# Patient Record
Sex: MALE | Race: WHITE | NOT HISPANIC OR LATINO | Employment: OTHER | URBAN - METROPOLITAN AREA
[De-identification: names, ages, dates, MRNs, and addresses within clinical notes are randomized per-mention and may not be internally consistent; named-entity substitution may affect disease eponyms.]

---

## 2017-09-18 PROBLEM — T46.6X5A STATIN MYOPATHY: Status: ACTIVE | Noted: 2017-09-18

## 2017-09-18 PROBLEM — R07.9 CHEST PAIN: Status: ACTIVE | Noted: 2017-09-18

## 2017-09-18 PROBLEM — Z86.19 HISTORY OF LYME DISEASE: Status: ACTIVE | Noted: 2017-09-18

## 2017-09-18 PROBLEM — M10.9 GOUT: Status: ACTIVE | Noted: 2017-09-18

## 2017-09-18 PROBLEM — G72.0 STATIN MYOPATHY: Status: ACTIVE | Noted: 2017-09-18

## 2017-09-18 PROBLEM — I10 HTN (HYPERTENSION): Status: ACTIVE | Noted: 2017-09-18

## 2017-09-18 PROBLEM — E78.5 HYPERLIPIDEMIA: Status: ACTIVE | Noted: 2017-09-18

## 2017-09-18 PROBLEM — I34.1 MVP (MITRAL VALVE PROLAPSE): Status: ACTIVE | Noted: 2017-09-18

## 2017-09-18 PROBLEM — Z82.49 FAMILY HISTORY OF MI (MYOCARDIAL INFARCTION): Status: ACTIVE | Noted: 2017-09-18

## 2018-06-20 ENCOUNTER — HISTORICAL (OUTPATIENT)
Dept: ENDOSCOPY | Facility: HOSPITAL | Age: 63
End: 2018-06-20

## 2022-04-30 NOTE — OP NOTE
* Final Report *    [facility] (Verified)  DISCHARGE DATE:      PREOPERATIVE DIAGNOSIS:  Screening colonoscopy, fecal occult blood test positive.    POSTOPERATIVE DIAGNOSIS:  Colon polyp x1 in the mid rectum.    PROCEDURE:  Colonoscopy with cold biopsy polypectomy x1.    COMPLICATIONS:  None.    BLOOD LOSS:  Minimal.    FINDINGS:  There was a 3 mm polyp in the mid rectum that is removed and retrieved in its entirety with cold biopsy forceps.    RECOMMENDATION:  Re-scope in 5 years.    DETAILS OF THE PROCEDURE:  After the risks and benefits of the procedure were explained to the patient, informed consent was obtained.  He was taken to endoscopy on 06/20/2018.  He was placed on the endoscopy table in the left lateral decubitus position.  Over the course of procedure, he was administered propofol by Anesthesiology.  I performed a rectal examination, which was negative.  I then inserted the colonoscope all the way to the base of the cecum as evidenced by the appendiceal orifice and the ileocecal valve.  I centered the scope and slowly began to retract it looking around in a 360-degree fashion.  There were no mucosal abnormalities throughout the colon.  No masses, no polyps, no AVMs, no diverticula.  In the middle of the rectum though, there was a small 3 mm polyp.  It was a little raised, very easy to remove with cold biopsy forceps.  I retroflexed.  He really did not have significant hemorrhoidal disease to speak of.  The scope was retracted.  The patient tolerated the procedure well and was taken to the recovery room in stable condition.    RECOMMENDATIONS:  Re-scope in 5 years due to colon polyp finding.      ______________________________  MD SB Martinez/MICHELLE  DD:  06/20/2018  Time:  09:30AM  DT:  06/20/2018  Time:  09:40AM  Job #:  [id number]       Result type: Discharge Summary  Result date: June 20, 2018 9:40 CDT  Result status: Auth (Verified)  Result title: [facility]  Performed by: Jeison CLARKE,  Venancio QUINONEZ on June 20, 2018 9:30 CDT  Verified by: Jeison CLARKE, Venancio QUINONEZ on June 22, 2018 7:27 CDT  Encounter info: [id number], [facility], Day Surgery, 6/20/2018 - 6/20/2018  Contributor system: [facility]    Doc has been moved by HIM specialist to the correct doc type.